# Patient Record
Sex: MALE | Race: WHITE | Employment: FULL TIME | URBAN - METROPOLITAN AREA
[De-identification: names, ages, dates, MRNs, and addresses within clinical notes are randomized per-mention and may not be internally consistent; named-entity substitution may affect disease eponyms.]

---

## 2020-03-18 ENCOUNTER — HOSPITAL ENCOUNTER (EMERGENCY)
Age: 50
Discharge: HOME | End: 2020-03-18
Payer: COMMERCIAL

## 2020-03-18 VITALS
BODY MASS INDEX: 23.03 KG/M2 | TEMPERATURE: 98.5 F | DIASTOLIC BLOOD PRESSURE: 70 MMHG | HEART RATE: 80 BPM | WEIGHT: 170 LBS | RESPIRATION RATE: 20 BRPM | OXYGEN SATURATION: 96 % | SYSTOLIC BLOOD PRESSURE: 117 MMHG | HEIGHT: 72 IN

## 2020-03-18 PROCEDURE — 69209 REMOVE IMPACTED EAR WAX UNI: CPT

## 2020-03-18 PROCEDURE — 99282 EMERGENCY DEPT VISIT SF MDM: CPT

## 2020-03-18 RX ORDER — ACETAMINOPHEN 500 MG
1000 TABLET ORAL 3 TIMES DAILY
Qty: 42 TABLET | Refills: 0 | Status: SHIPPED | OUTPATIENT
Start: 2020-03-18 | End: 2020-03-25

## 2020-03-18 RX ORDER — GUAIFENESIN AND DEXTROMETHORPHAN HYDROBROMIDE 1200; 60 MG/1; MG/1
1 TABLET, EXTENDED RELEASE ORAL EVERY 12 HOURS PRN
Qty: 28 TABLET | Refills: 0 | Status: SHIPPED | OUTPATIENT
Start: 2020-03-18

## 2020-03-18 RX ORDER — LORATADINE AND PSEUDOEPHEDRINE SULFATE 10; 240 MG/1; MG/1
1 TABLET, EXTENDED RELEASE ORAL DAILY
Qty: 14 TABLET | Refills: 0 | Status: SHIPPED | OUTPATIENT
Start: 2020-03-18

## 2020-03-18 RX ORDER — IBUPROFEN 600 MG/1
600 TABLET ORAL 3 TIMES DAILY PRN
Qty: 21 TABLET | Refills: 0 | Status: SHIPPED | OUTPATIENT
Start: 2020-03-18 | End: 2020-03-25

## 2020-03-18 ASSESSMENT — PAIN DESCRIPTION - DESCRIPTORS: DESCRIPTORS: ACHING

## 2020-03-18 ASSESSMENT — PAIN SCALES - GENERAL: PAINLEVEL_OUTOF10: 4

## 2020-03-18 NOTE — ED PROVIDER NOTES
[facility]     Department of Emergency Medicine   ED  Provider Note  Admit Date/RoomTime: 3/18/2020  2:08 PM  ED Room: [place] [address]   Chief Complaint:   Fever (2 DAYS) and Other (NOT FEELING WELL AND CAN'T GO BACK TO WORK)    History of Present Illness   Source of history provided by:  patient. History/Exam Limitations: none. Kellen Argueta is a 51 y.o. old male who has a past medical Hx of: No past medical history on file. presents to the emergency department by private vehicle, for chills and sweats, which began 5 day(s) prior to arrival.  Patient specifically told her vertigo symptoms started on Friday, gradually worsened, then stabilized up until yesterday. He states that his symptoms are much better today compared to how he has felt. Patient states that the only reason he came to the emergency department was because his employer made him come in to be evaluated. His symptoms are associated with rhinorrhea, cough, and fatigue. There has been NO history of abdominal pain, appetite decrease, chest pain, conjunctivitis, diarrhea, dizziness, dysuria, earache, ear pulling, hoarseness, irritability, joint swelling, nausea, neck stiffness, rash, sore throat, swollen glands, urinary frequency, vomiting or wheezing. He has been treated with nothing prior to arrival.    ROS    Pertinent positives and negatives are stated within HPI, all other systems reviewed and are negative. No past surgical history on file. Social History:  reports that he has been smoking cigarettes. He has been smoking about 1.00 pack per day. He does not have any smokeless tobacco history on file. He reports current drug use. Drug: Marijuana. Family History: family history is not on file. Allergies: Patient has no known allergies.     Physical Exam         ED Triage Vitals [03/18/20 1403]   BP Temp Temp src Pulse Resp SpO2 Height Weight   117/70 98.5 °F (36.9 °C) -- 80 20 96 % 6' (1.829 m) 170 lb (77.1 kg)     Oxygen Saturation Interpretation: Normal.    Constitutional:  Alert, development consistent with age. HEENT:  NC/NT. Clear rhinorrhea. Cerumen impaction on the left. Tympanic membranes clear with good landmarks bilaterally post procedure. Airway patent. Neck:  Normal ROM. Supple. Respiratory:  Clear to auscultation and breath sounds equal.  CV:  Regular rate and rhythm, normal heart sounds, without pathological murmurs, ectopy, gallops, or rubs. Integument:  Normal turgor. Warm, dry, without visible rash, unless noted elsewhere. Lymphatic: no lymphadenopathy noted  Neurological:  Oriented. Motor functions intact. Lab / Imaging Results   (All laboratory and radiology results have been personally reviewed by myself)  Labs:  No results found for this visit on 03/18/20. Imaging: All Radiology results interpreted by Radiologist unless otherwise noted. No orders to display     ED Course / Medical Decision Making   Medications - No data to display     Consultations:             None    Procedures:     PROCEDURE  3/18/20       Time: 1430    CERUMEN IMPACTION REMOVAL  Risks, benefits and alternatives (for applicable procedures below) described. Performed By: Charissa Barillas PA-C. Location:   Excessive cerumen left ear canal.    Informed consent: by patient or legal gardian. Skin Prep:  none  required. Anesthetic: not required. The patient's head was positioned appropriately and the foreign body was removed using an ear curette. .  Complications: none. Patient tolerated the procedure well. MDM: Patient presents to the emergency department for upper respiratory symptoms. Patient states his symptoms have improved since yesterday. Patient will be given the prescriptions below for symptomatic treatment. Strep pharyngitis considered but highly unlikely. Vital signs stable. He is extremely well-appearing appropriate discharge and [facility]   Specific conditions for